# Patient Record
(demographics unavailable — no encounter records)

---

## 2018-11-14 NOTE — PHYS DOC
Past Medical History


Past Medical History:  Bronchitis, COPD, Hypertension


Past Surgical History:  Appendectomy, Cholecystectomy, Tonsillectomy


Additional Past Surgical Histo:  BOWEL SX, CARPAL TUNNEL


Alcohol Use:  None


Drug Use:  None





Adult General


Chief Complaint


Chief Complaint:  LOWER EXT PAIN





\[facility] [place] [facility]\""


HPI





Patient is a 59  year old female who presents with right knee pain. The patient 

denies injury. The patient states that she has a shower chair and did tip 

forward on the shower chair couple of days ago but denies falling. She states 

that she has chronic knee pain. She does walk with a cane. She states that this 

pain is just more increased than normal.





Review of Systems


Review of Systems





Constitutional: Denies fever or chills []


Respiratory: Denies cough or shortness of breath []


Cardiovascular: No additional information not addressed in HPI []


GI: Denies abdominal pain, nausea, vomiting, bloody stools or diarrhea []


: Denies dysuria or hematuria []


Musculoskeletal: See history of present illness


Integument: Denies rash or skin lesions []


Neurologic: Denies headache, focal weakness or sensory changes []


Endocrine: Denies polyuria or polydipsia []





All other systems were reviewed and found to be within normal limits, except as 

documented in this note.





Allergies


Allergies





Allergies








Coded Allergies Type Severity Reaction Last Updated Verified


 


  metronidazole Allergy Intermediate  17 Yes











Physical Exam


Physical Exam





Constitutional: Well developed, well nourished, no acute distress, non-toxic 

appearance. []


Cardiovascular:Heart rate regular rhythm, no murmur []


Lungs & Thorax:  Bilateral breath sounds clear to auscultation []


Abdomen: Bowel sounds normal, soft, no tenderness, no masses, no pulsatile 

masses. [] 


Skin: Warm, dry, no erythema, no rash. [] 


Back: No tenderness, no CVA tenderness. [] 


Extremities: right knee tenderness to palpation, no cyanosis, no clubbing, ROM 

intact, no edema or ecchymosis, pulses and sensation are intact distal to 

injury. [] 


Neurologic: Alert and oriented X 3, normal motor function, normal sensory 

function, no focal deficits noted. []


Psychologic: Affect normal, judgement normal, mood normal. []





Current Patient Data


Vital Signs





 Vital Signs








  Date Time  Temp Pulse Resp B/P (MAP) Pulse Ox O2 Delivery O2 Flow Rate FiO2


 


18 21:13  88  149/79 (102) 97 Room Air  


 


18 19:06 97.9  18     





 97.9       











EKG


EKG


[]





Radiology/Procedures


Radiology/Procedures


[]


PATIENT: BIN SMITH ACCOUNT: [id number] MRN#: [id number]


: 1959 LOCATION: ER AGE: 59


SEX: F EXAM DT: 18 ACCESSION#: [id number]


STATUS: REG ER ORD. PHYSICIAN: EUGENE BRITO 


REASON: pain x 1 day


PROCEDURE: KNEE RIGHT 3V





3 views right knee dated 2018.


 


No comparison available.


 


Clinical data indication: Pain for week recent fall.


 


FINDINGS:


 


3 views right knee show normal bony alignment. No displaced fracture. No 


acute osseous or articular abnormality. Moderate tricompartmental 


hypertrophic change with asymmetric medial joint space narrowing. No joint


effusion or loose body.


 


IMPRESSION:


1. No acute radiographic abnormality.


2. Moderate tricompartmental DJD.


 


Electronically signed by: Woodrow Mancilla MD (2018 8:31 PM) 


[facility]














DICTATED and SIGNED BY:     WOODROW MANCILLA MD


DATE:     18





Course & Med Decision Making


Course & Med Decision Making


Pertinent Labs and Imaging studies reviewed. (See chart for details)





[]





Dragon Disclaimer


Dragon Disclaimer


This electronic medical record was generated, in whole or in part, using a 

voice recognition dictation system.





Departure


Departure


Impression:  


 Primary Impression:  


 Osteoarthritis


Disposition:  01 HOME, SELF-CARE


Condition:  STABLE


Referrals:  


KATHRYN MONTERO MD (PCP)


Patient Instructions:  Osteoarthritis





Additional Instructions:  


Take the medication with food. Follow-up with your primary care provider for 

recheck in 3 days if not improving or for possible referral to orthopedics.


Scripts


Meloxicam (MOBIC) 7.5 Mg Tablet


1 TAB PO DAILY for pain, #15 TAB 1 Refill


   Prov: EUGENE BRITO         18











EUGENE BRITO 2018 20:10

## 2018-11-14 NOTE — RAD
3 views right knee dated 11/14/2018.

 

No comparison available.

 

Clinical data indication: Pain for week recent fall.

 

FINDINGS:

 

3 views right knee show normal bony alignment. No displaced fracture. No 

acute osseous or articular abnormality. Moderate tricompartmental 

hypertrophic change with asymmetric medial joint space narrowing. No joint

effusion or loose body.

 

IMPRESSION:

1. No acute radiographic abnormality.

2. Moderate tricompartmental DJD.

 

Electronically signed by: Woodrow Mancilla MD (11/14/2018 8:31 PM) 

[facility]

## 2018-11-24 NOTE — PHYS DOC
Past Medical History


Past Medical History:  Bronchitis, COPD, Hypertension


Past Surgical History:  Appendectomy, Cholecystectomy, Tonsillectomy


Additional Past Surgical Histo:  BOWEL SX, CARPAL TUNNEL


Alcohol Use:  None


Drug Use:  None





Adult General


Chief Complaint


Chief Complaint:  NAUSEA/VOMITING/DIARRHA





HPI


HPI





Patient is a 59  year old [f__sex] who presents with []





Review of Systems


Review of Systems





Constitutional: Denies fever or chills []


Eyes: Denies change in visual acuity, redness, or eye pain []


HENT: Denies nasal congestion or sore throat []


Respiratory: Denies cough or shortness of breath []


Cardiovascular: No additional information not addressed in HPI []


GI: Denies abdominal pain, nausea, vomiting, bloody stools or diarrhea []


: Denies dysuria or hematuria []


Musculoskeletal: Denies back pain or joint pain []


Integument: Denies rash or skin lesions []


Neurologic: Denies headache, focal weakness or sensory changes []


Endocrine: Denies polyuria or polydipsia []





All other systems were reviewed and found to be within normal limits, except as 

documented in this note.





Current Medications


Current Medications





Current Medications








 Medications


  (Trade)  Dose


 Ordered  Sig/Giuliano  Start Time


 Stop Time Status Last Admin


Dose Admin


 


 Famotidine


  (Pepcid Vial)  20 mg  1X  ONCE  11/24/18 02:30


 11/24/18 02:31 DC 11/24/18 02:08


20 MG


 


 Fluconazole


  (Diflucan)  200 mg  1X  ONCE  11/24/18 02:45


 11/24/18 02:46 UNV  





 


 Ondansetron HCl


  (Zofran)  4 mg  1X  ONCE  11/24/18 02:30


 11/24/18 02:31 DC 11/24/18 02:08


4 MG


 


 Sodium Chloride  1,000 ml @ 


 1,000 mls/hr  1X  ONCE  11/24/18 02:30


 11/24/18 03:29   














Allergies


Allergies





Allergies








Coded Allergies Type Severity Reaction Last Updated Verified


 


  metronidazole Allergy Intermediate  5/12/17 Yes











Physical Exam


Physical Exam





Constitutional: Well developed, well nourished, no acute distress, non-toxic 

appearance. []


HENT: Normocephalic, atraumatic, bilateral external ears normal, oropharynx 

moist, no oral exudates, nose normal. []


Eyes: PERRLA, EOMI, conjunctiva normal, no discharge. [] 


Neck: Normal range of motion, no tenderness, supple, no stridor. [] 


Cardiovascular:Heart rate regular rhythm, no murmur []


Lungs & Thorax:  Bilateral breath sounds clear to auscultation []


Abdomen: Bowel sounds normal, soft, no tenderness, no masses, no pulsatile 

masses. [] 


Skin: Warm, dry, no erythema, no rash. [] 


Back: No tenderness, no CVA tenderness. [] 


Extremities: No tenderness, no cyanosis, no clubbing, ROM intact, no edema. [] 


Neurologic: Alert and oriented X 3, normal motor function, normal sensory 

function, no focal deficits noted. []


Psychologic: Affect normal, judgement normal, mood normal. []





Current Patient Data


Vital Signs





 Vital Signs








  Date Time  Temp Pulse Resp B/P (MAP) Pulse Ox O2 Delivery O2 Flow Rate FiO2


 


11/24/18 02:25  95 18 148/69 (95) 95 Room Air  


 


11/24/18 01:53 97.7       





 97.7       








Lab Values





 Laboratory Tests








Test


 11/24/18


01:50 11/24/18


02:02


 


White Blood Count


 10.9 x10^3/uL


(4.0-11.0) 





 


Red Blood Count


 4.23 x10^6/uL


(3.50-5.40) 





 


Hemoglobin


 12.5 g/dL


(12.0-15.5) 





 


Hematocrit


 36.5 %


(36.0-47.0) 





 


Mean Corpuscular Volume


 86 fL ()


 





 


Mean Corpuscular Hemoglobin 30 pg (25-35)   


 


Mean Corpuscular Hemoglobin


Concent 34 g/dL


(31-37) 





 


Red Cell Distribution Width


 14.3 %


(11.5-14.5) 





 


Platelet Count


 235 x10^3/uL


(140-400) 





 


Neutrophils (%) (Auto) 80 % (31-73)  H 


 


Lymphocytes (%) (Auto) 14 % (24-48)  L 


 


Monocytes (%) (Auto) 4 % (0-9)   


 


Eosinophils (%) (Auto) 1 % (0-3)   


 


Basophils (%) (Auto) 1 % (0-3)   


 


Neutrophils # (Auto)


 8.7 x10^3uL


(1.8-7.7)  H 





 


Lymphocytes # (Auto)


 1.6 x10^3/uL


(1.0-4.8) 





 


Monocytes # (Auto)


 0.5 x10^3/uL


(0.0-1.1) 





 


Eosinophils # (Auto)


 0.1 x10^3/uL


(0.0-0.7) 





 


Basophils # (Auto)


 0.1 x10^3/uL


(0.0-0.2) 





 


Sodium Level


 140 mmol/L


(136-145) 





 


Potassium Level


 3.7 mmol/L


(3.5-5.1) 





 


Chloride Level


 103 mmol/L


() 





 


Carbon Dioxide Level


 25 mmol/L


(21-32) 





 


Anion Gap 12 (6-14)   


 


Blood Urea Nitrogen


 16 mg/dL


(7-20) 





 


Creatinine


 1.4 mg/dL


(0.6-1.0)  H 





 


Estimated GFR


(Cockcroft-Gault) 38.5  


 





 


BUN/Creatinine Ratio 11 (6-20)   


 


Glucose Level


 151 mg/dL


(70-99)  H 





 


Calcium Level


 9.4 mg/dL


(8.5-10.1) 





 


Total Bilirubin


 0.4 mg/dL


(0.2-1.0) 





 


Aspartate Amino Transferase


(AST) 15 U/L (15-37)


 





 


Alanine Aminotransferase (ALT)


 16 U/L (14-59)


 





 


Alkaline Phosphatase


 101 U/L


() 





 


Total Protein


 7.9 g/dL


(6.4-8.2) 





 


Albumin


 3.6 g/dL


(3.4-5.0) 





 


Albumin/Globulin Ratio


 0.8 (1.0-1.7)


L 





 


Lipase


 156 U/L


() 





 


Urine Collection Type  Unknown  


 


Urine Color  Hope  


 


Urine Clarity  Clear  


 


Urine pH  5.0  


 


Urine Specific Gravity  >=1.030  


 


Urine Protein


 


 >=300 mg/dL


(NEG-TRACE)


 


Urine Glucose (UA)


 


 Negative mg/dL


(NEG)


 


Urine Ketones (Stick)


 


 Negative mg/dL


(NEG)


 


Urine Blood  Small (NEG)  


 


Urine Nitrite


 


 Negative (NEG)





 


Urine Bilirubin  Small (NEG)  


 


Urine Urobilinogen Dipstick


 


 1.0 mg/dL (0.2


mg/dL)


 


Urine Leukocyte Esterase


 


 Negative (NEG)





 


Urine RBC


 


 1-2 /HPF (0-2)





 


Urine WBC


 


 1-4 /HPF (0-4)





 


Urine Squamous Epithelial


Cells 


 Mod /LPF  





 


Urine Bacteria


 


 Many /HPF


(0-FEW)


 


Urine Mucus  Mod /LPF  


 


Urine Yeast  Present /HPF  





 Laboratory Tests


11/24/18 01:50








 Laboratory Tests


11/24/18 01:50














EKG


EKG


[]





Radiology/Procedures


Radiology/Procedures


[]





Course & Med Decision Making


Course & Med Decision Making


Pertinent Labs and Imaging studies reviewed. (See chart for details)





[]





Dragon Disclaimer


Dragon Disclaimer


This electronic medical record was generated, in whole or in part, using a 

voice recognition dictation system.





Departure


Departure


Impression:  


 Primary Impression:  


 Nausea vomiting and diarrhea


 Additional Impression:  


 Yeast cystitis


Disposition:  01 HOME, SELF-CARE


Condition:  IMPROVED


Referrals:  


KATHRYN MONTERO MD (PCP)


Patient Instructions:  Candida Infection, Adult, Diarrhea, Easy-to-Read, Diet 

for Diarrhea, Adult, Nausea and Vomiting, Easy-to-Read


Scripts


Fluconazole (DIFLUCAN) 150 Mg Tablet


1 TAB PO ONCE, #1 TAB 0 Refills


   Take dose in 1 week if symptoms continue.


   Prov: RICH AHUMADA DO         11/24/18 


Famotidine (PEPCID) 20 Mg Tablet


20 MG PO BID, #14 TAB


   Prov: RICH AHUMADA DO         11/24/18 


Ondansetron (ONDANSETRON ODT) 4 Mg Tab.rapdis


1 TAB PO PRN Q6-8HRS for VOMITING, #16 TAB


   Prov: RICH AHUMADA DO         11/24/18





Problem Qualifiers











RICH AHUMADA DO Nov 24, 2018 02:41

## 2019-06-28 NOTE — RAD
MRI of the lumbar spine without contrast 6/28/2019

 

CLINICAL HISTORY: Low back pain with bilateral leg weakness.

 

TECHNIQUE: Unenhanced T1-weighted and T2-weighted sagittal and axial and 

inversion recovery sagittal images of the lumbar spine were obtained.

 

FINDINGS: Minimal S-shaped curvature of the thoracolumbar spine is seen. 

Degenerative signal changes are seen involving all of the disks of the 

lumbar spine. Degenerative signal changes are seen within the marrow 

surrounding these discs. The conus medullaris is normal morphology, 

position, and signal characteristics.

 

At the L1-2 disc space there is a mild to moderate generalized disc bulge.

Degenerative changes are seen involving the facet joints bilaterally. 

There is mild ligamentum flavum hypertrophy bilaterally. There is 

prominence of the posterior epidural fat. These findings when combined do 

not result in significant central spinal canal or neural foraminal 

stenosis.

 

At the L2-3 disc space there is a mild to moderate generalized disc bulge.

This is eccentric to the left. Degenerative changes are seen involving the

facet joints bilaterally. There is mild ligamentum flavum hypertrophy 

bilaterally. There is prominence of the posterior epidural fat. There is a

small right facet joint effusion. These findings when combined result in 

mild to moderate central spinal canal stenosis. No neural foraminal 

stenosis is seen.

 

At the L3-4 disc space there is a mild generalized disc bulge. 

Degenerative changes are seen involving the facet joints bilaterally. 

There is mild ligamentum flavum hypertrophy bilaterally. There is 

prominence of the posterior epidural fat. These findings when combined 

result in mild central spinal canal stenosis. No neural foraminal stenosis

is seen.

 

At the L4-5 disc space there is a mild generalized disc bulge. 

Superimposed on this disc bulge is a right paracentral focal disc 

protrusion. This measures 3.5 mm in AP diameter. Degenerative changes are 

seen involving the facet joints, right greater than left. There is mild to

moderate ligamentum flavum hypertrophy bilaterally. There is prominence of

the posterior epidural fat. These findings when combined result in mild to

moderate right greater than left central spinal canal stenosis. Mild right

neural foraminal stenosis is seen. The left neural foramen is patent.

 

At the L5-S1 disc space there is a mild to moderate generalized disc 

bulge. This is eccentric to the left. Degenerative changes are seen 

involving the facet joints bilaterally. There is mild ligamentum flavum 

hypertrophy bilaterally. These findings when combined result in mild 

central spinal canal stenosis. Mild bilateral neural foraminal stenosis is

seen.

 

IMPRESSION: The changes of degenerative disc disease are seen throughout 

the lumbar spine. These findings result in mild to moderate central spinal

canal stenosis at L2-3, mild central spinal canal stenosis at L3-4 and 

L5-S1 and mild to moderate right greater than left central spinal canal 

stenosis at L4-5. Mild right neural foraminal stenosis is seen at L4-5. 

Mild bilateral neural foraminal stenosis is seen at L5-S1.

 

Electronically signed by: Stepan Bradshaw MD (6/28/2019 2:21 PM) [facility]

## 2019-09-11 NOTE — PHYS DOC
Past Medical History


Past Medical History:  Bronchitis, COPD, Hypertension, Sciatica


Past Surgical History:  Appendectomy, Cholecystectomy, Tonsillectomy


Additional Past Surgical Histo:  BOWEL SX, CARPAL TUNNEL


Alcohol Use:  None


Drug Use:  None





Adult General


Chief Complaint


Chief Complaint:  HIP PAIN





HPI


HPI





Patient is a 60  year old female with a history of COPD, bronchitis, 

hypertension, sciatica, who presents today complaining of 7 out of 10 left hip 

pain that began today when she was walking. She states the pain originates from 

her back into the hip into the left lower extremity. She states the pain is 

worse when she is walking. Denies any injury. Denies any loss of bowel bladder 

function. She states she has been seen by her own doctor for this pain before, 

she states they did an MRI of her lumbar spine which showed she had disc issues 

as well as sciatica. She states she is supposed to follow-up with the specialist

next week for injections but she does not have anything for pain at home.





Review of Systems


Review of Systems





Constitutional: Denies fever or chills []





Musculoskeletal: Reports left low back pain radiating to the left lower 

extremity, reports left hip pain.


Integument: Denies rash or skin lesions []


Neurologic: Denies headache, focal weakness or sensory changes []








All other systems were reviewed and found to be within normal limits, except as 

documented in this note.





Current Medications


Current Medications





Current Medications








 Medications


  (Trade)  Dose


 Ordered  Sig/Giuliano  Start Time


 Stop Time Status Last Admin


Dose Admin


 


 Acetaminophen/


 Hydrocodone Bitart


  (Lortab 5/325)  2 tab  1X  ONCE  9/11/19 21:15


 9/11/19 21:16   





 


 Cyclobenzaprine


 HCl


  (Flexeril)  10 mg  1X  ONCE  9/11/19 21:15


 9/11/19 21:16   





 


 Naproxen


  (Naprosyn)  500 mg  1X  ONCE  9/11/19 21:15


 9/11/19 21:16   














Allergies


Allergies





Allergies








Coded Allergies Type Severity Reaction Last Updated Verified


 


  metronidazole Allergy Intermediate  5/12/17 Yes











Physical Exam


Physical Exam





Constitutional: Well developed, well nourished, no acute distress, non-toxic 

appearance. []


Skin: Warm, dry, no erythema, no rash. [] 


Back: Tenderness on palpation of the left SI joint, no midline lumbar spine 

tenderness, no CVA tenderness. [] 


Extremities: Morbidly obese patient. No tenderness, no cyanosis, no clubbing, 

ROM intact, no edema. [] 


Neurologic: Alert and oriented X 3, normal motor function, normal sensory 

function, no focal deficits noted. []


Psychologic: Affect normal, judgement normal, mood normal. []





EKG


EKG


[]





Radiology/Procedures


Radiology/Procedures


[]





Course & Med Decision Making


Course & Med Decision Making


Pertinent Labs and Imaging studies reviewed. (See chart for details)





This is a 60-year-old female patient who presents to the ED today complaining of

 left hip pain radiating to the left lower extremity. No known injury. Patient 

has history of sciatica. She has not called her a quick no syndrome symptoms. 

She is scheduled to have injections to her spine next week. Prescription pain 

medicine provided, follow-up with her own doctor next week.





Dragon Disclaimer


Dragon Disclaimer


This electronic medical record was generated, in whole or in part, using a voice

 recognition dictation system.





Departure


Departure


Impression:  


   Primary Impression:  


   Left hip pain


   Additional Impression:  


   Sciatica, left side


Disposition:  01 HOME, SELF-CARE


Condition:  STABLE


Referrals:  


KATHRYN MONTERO MD (PCP)


follow up next week


Patient Instructions:  Hip Pain, Sciatica with [facility]





Additional Instructions:  


You were evaluated in the emergency room for hip pain. Please follow-up with 

your own doctor next week. Take the prescribed medicine as needed for pain.


Scripts


Cyclobenzaprine Hcl (CYCLOBENZAPRINE HCL) 10 Mg Tablet


1 TAB PO TID, #30 TAB


   Prov: HEATHER ORTIZ         9/11/19 


Hydrocodone/Apap 5-325 (NORCO 5-325 TABLET) 1 Each Tablet


1 TAB PO Q6HRS PRN for PAIN MDD 8, #12 TAB


   Prov: HEATHER ORTIZ         9/11/19





Problem Qualifiers











HEATHER ORTIZ              Sep 11, 2019 21:00

## 2019-09-12 NOTE — RAD
HIP LEFT 2V WITH PELVIS 

 

DATE:   9/11/2019 12:00 AM

 

INDICATION:  Hip pain for one week, no injury  

 

COMPARISON:  None.

 

FINDINGS: 

 

Bones: There is no evidence of acute fracture or dislocation.

 

Joints: The joint spaces are normal.  

 

Miscellaneous: None.

 

IMPRESSION: 

 

No evidence of acute fracture.

 

Electronically signed by: Mahad Wan MD (9/12/2019 12:44 AM) 

[facility]

## 2020-05-12 NOTE — RAD
DATE: 10/4/2018   



EXAM: DIGITAL SCREEN BILAT W/CAD



HISTORY: Routine screening   



COMPARISON: None available   



This study was interpreted with the benefit of Computerized Aided Detection

(CAD).





Breast Density: SCATTERED The breast parenchyma shows scattered fibroglandular

densities. Breast parenchyma level B.





FINDINGS: No suspicious breast densities are seen.  Minimal benign type

calcifications are noted.  No suspicious microcalcifications are identified.  





IMPRESSION: There is no mammographic evidence of malignancy in either breast. 

 







BI-RADS CATEGORY: 2 BENIGN FINDING(S)



RECOMMENDED FOLLOW-UP: 12M 12 MONTH FOLLOW-UP



PQRS compliance statement: Patient information was entered into a reminder

system with a target due date     for the next mammogram.



Mammography is a sensitive method for finding small breast cancers, but it

does not detect them all and is not a substitute for careful clinical

examination.  A negative mammogram does not negate a clinically suspicious

finding and should not result in delay in biopsying a clinically suspicious

abnormality.



"Our facility is accredited by the American [employer] Mammography

Program."
[employer]